# Patient Record
Sex: MALE | URBAN - METROPOLITAN AREA
[De-identification: names, ages, dates, MRNs, and addresses within clinical notes are randomized per-mention and may not be internally consistent; named-entity substitution may affect disease eponyms.]

---

## 2024-05-23 ENCOUNTER — NURSE TRIAGE (OUTPATIENT)
Dept: NURSING | Facility: CLINIC | Age: 49
End: 2024-05-23

## 2024-05-23 NOTE — TELEPHONE ENCOUNTER
New diabetic. On drugs lantus 25 units and other meds. Yesterday it was low down to 54. Eating normal, sticking to schedule. Last night ate enough to get it up and told to call in when it happens. Will you alter the Lantus insulin? Diet was the same. He called [facility]. This is their support line. Primary MD is Mary. Last night he ate, 250 at 4 a.m and now it's 140. He will call [facility] at 8 a.m. to discuss next steps in plan of care since he is not a [facility] patient. I told him, if he doesn't get thru to [facility] he needs to call his PCP with Mary. He understands.  Mayela Salgado RN  [facility]    Reason for Disposition   Nursing judgment    Additional Information   Negative: New-onset or worsening symptoms, see that protocol (e.g., diarrhea, runny nose, sore throat)   Negative: Medicine question not related to refill or renewal   Negative: Requesting a renewal or refill of a medicine patient is currently taking   Negative: Questions or concerns about high blood pressure   Negative: Nursing judgment    Protocols used: Information Only Call - No Triage-A-[place]